# Patient Record
Sex: MALE | Race: ASIAN | NOT HISPANIC OR LATINO
[De-identification: names, ages, dates, MRNs, and addresses within clinical notes are randomized per-mention and may not be internally consistent; named-entity substitution may affect disease eponyms.]

---

## 2023-03-14 PROBLEM — Z00.00 ENCOUNTER FOR PREVENTIVE HEALTH EXAMINATION: Status: ACTIVE | Noted: 2023-03-14

## 2023-03-14 NOTE — HISTORY OF PRESENT ILLNESS
[FreeTextEntry1] : 56 yo M presents for initial evaluation of sleep disordered breathing\par Referred by ______. \par PMH:\par \par Sleep history: \par Main complaints of nonrestorative sleep, severe snoring and daytime somnolence.\par Bed: ______pm, no difficultly falling asleep, wakes ______to urinate, out of bed at ______ \par Unrefreshed upon awakening. \par Morning headaches: \par Dry mouth/throat: \par Weight trend:\par ? drowsy driving, denies any accidents or near accidents. \par EDS with ESS of ____\par \par Quality Metrics:\par Smoking history:\par ESS:\par \par Vaccines: \par COVID:\par Influenza:\par Pneumococcal:\par

## 2023-03-15 ENCOUNTER — APPOINTMENT (OUTPATIENT)
Dept: PULMONOLOGY | Facility: CLINIC | Age: 58
End: 2023-03-15